# Patient Record
Sex: FEMALE | Race: WHITE | NOT HISPANIC OR LATINO
[De-identification: names, ages, dates, MRNs, and addresses within clinical notes are randomized per-mention and may not be internally consistent; named-entity substitution may affect disease eponyms.]

---
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
## 2019-05-21 ENCOUNTER — APPOINTMENT (OUTPATIENT)
Dept: ORTHOPEDIC SURGERY | Facility: CLINIC | Age: 19
End: 2019-05-21
Payer: COMMERCIAL

## 2019-05-21 VITALS
SYSTOLIC BLOOD PRESSURE: 105 MMHG | BODY MASS INDEX: 24.55 KG/M2 | HEIGHT: 68 IN | WEIGHT: 162 LBS | HEART RATE: 59 BPM | DIASTOLIC BLOOD PRESSURE: 71 MMHG

## 2019-05-21 DIAGNOSIS — S43.402A UNSPECIFIED SPRAIN OF LEFT SHOULDER JOINT, INITIAL ENCOUNTER: ICD-10-CM

## 2019-05-21 PROCEDURE — 99214 OFFICE O/P EST MOD 30 MIN: CPT

## 2019-05-21 PROCEDURE — 73030 X-RAY EXAM OF SHOULDER: CPT | Mod: LT

## 2019-12-16 ENCOUNTER — APPOINTMENT (OUTPATIENT)
Dept: ENDOCRINOLOGY | Facility: CLINIC | Age: 19
End: 2019-12-16
Payer: COMMERCIAL

## 2019-12-16 VITALS
OXYGEN SATURATION: 98 % | HEART RATE: 87 BPM | BODY MASS INDEX: 31.22 KG/M2 | HEIGHT: 68 IN | DIASTOLIC BLOOD PRESSURE: 80 MMHG | WEIGHT: 206 LBS | SYSTOLIC BLOOD PRESSURE: 108 MMHG

## 2019-12-16 DIAGNOSIS — Z82.49 FAMILY HISTORY OF ISCHEMIC HEART DISEASE AND OTHER DISEASES OF THE CIRCULATORY SYSTEM: ICD-10-CM

## 2019-12-16 DIAGNOSIS — Z80.3 FAMILY HISTORY OF MALIGNANT NEOPLASM OF BREAST: ICD-10-CM

## 2019-12-16 DIAGNOSIS — Z80.0 FAMILY HISTORY OF MALIGNANT NEOPLASM OF DIGESTIVE ORGANS: ICD-10-CM

## 2019-12-16 DIAGNOSIS — Z83.6 FAMILY HISTORY OF OTHER DISEASES OF THE RESPIRATORY SYSTEM: ICD-10-CM

## 2019-12-16 PROCEDURE — 99244 OFF/OP CNSLTJ NEW/EST MOD 40: CPT

## 2019-12-16 NOTE — CONSULT LETTER
[Consult Letter:] : I had the pleasure of evaluating your patient, [unfilled]. [Dear  ___] : Dear  [unfilled], [Please see my note below.] : Please see my note below. [Consult Closing:] : Thank you very much for allowing me to participate in the care of this patient.  If you have any questions, please do not hesitate to contact me. [Sincerely,] : Sincerely, [FreeTextEntry2] : MAKAYLA HARO MD [FreeTextEntry3] : Noel Louis MD\par

## 2019-12-16 NOTE — ASSESSMENT
[FreeTextEntry1] : This is a  19  year old female with previous history of hyperandrogenism here for evaluation for PCOS. \par \par Given the Rotterdam criteria she meets 2 /3,  she has menstrual irregularity and hyperandrogenism. She will need an updated ultrasound of the ovaries otherwise. Will be further evaluating for other causes such as non-classical CAH, hyperprolactinemia. Metabolic parameters such as diabetes and lipid profile will be screened as well. Optimal OCP with 20 mcg of estrogen to reduce insulin resistance and progesterone with least amount of androgenic activity. \par \par \par -Check testosterone, prolactin, DHEA-s, 17-OHP, HgA1C, lipid profile, salivary cortisol \par \par -Ultrasound of the ovaries with ob/gyn in future. \par \par -Continue with OCP as prescribed by ob/gyn. \par \par -Will evaluate necessity for Metformin after checking HgA1C \par \par -Will call back with results \par \par -Extensive counseling on long term life style modifications such has decreased carbohydrates in diet, maintenance of good body weight and incorporation of exercise\par \par -Follow up in 6 months

## 2019-12-16 NOTE — HISTORY OF PRESENT ILLNESS
[FreeTextEntry1] : Ms. KRISHNA SANTOS is a 19 year old female referred by MAKAYLA HARO  for evaluation of \par \par She reports that age of menarche was at age 12, with menses regular, about every 6 weeks.\par \par She left in Aug 2018, she didn't get another period until Nov 2019.  Now her periods is about every 2-3 months.  She was started on OCP about June 2019.  She is getting a cycle every month since and improvement with acne.  Some improvement with acne.  \par \par Around Oct 2018, diagnosed with PCOS due to secondary amenorrhea. \par She reports hair growth in her chin and sideburn area.  \par She has acne\par She has no change in voice\par She hed gain some weight, between Sept and Nov 2018.  She gained almost 20 lbs.  She denies any significant change.  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  She is on practice 3 hours daily and at the gym about 1.5-2 hours.    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   \par \par \par

## 2019-12-16 NOTE — DATA REVIEWED
[FreeTextEntry1] : Oct 2018\par CHOLESTEROL 159\par HDL 65\par LDL 74\par A1C 5.3\par TSH 1.08\par PROLACTIN 10 (NORMAL)\par ESTRADIOL 36 \par LH 8.4\par FSH 5.6

## 2019-12-23 LAB
17OHP SERPL-MCNC: 25 NG/DL
CHOLEST SERPL-MCNC: 180 MG/DL
CHOLEST/HDLC SERPL: 3.4 RATIO
CORTIS SAL-MCNC: NORMAL
ESTIMATED AVERAGE GLUCOSE: 114 MG/DL
FSH SERPL-MCNC: 5 IU/L
HBA1C MFR BLD HPLC: 5.6 %
HDLC SERPL-MCNC: 53 MG/DL
LDLC SERPL CALC-MCNC: 113 MG/DL
LH SERPL-ACNC: 7.4 IU/L
TRIGL SERPL-MCNC: 69 MG/DL

## 2020-01-14 NOTE — ASU PREOP CHECKLIST - HEART RATE (BEATS/MIN)
MS RN DISCHARGE NOTES

Patient discharged for Home at this time. Patient in stable condition. VS stable with no 
acute distress. Breathing even and unlabored on room air with no respiratory distress. 
Denies pain. Skin intact. Medication reconciliation and discharge orders reviewed and 
explained to Patient and daughter. Patient and daughter verbalized understanding. All 
belongings with Patient. Patient will follow up with PCP in 1 week for repeat chest xray. 
Patient will resume and complete oral antibiotics as prescribed. Escorted Patient to the 
lobby for safety. Patient picked up by daughter. 76

## 2020-06-16 ENCOUNTER — APPOINTMENT (OUTPATIENT)
Dept: ENDOCRINOLOGY | Facility: CLINIC | Age: 20
End: 2020-06-16
Payer: COMMERCIAL

## 2020-06-16 VITALS
BODY MASS INDEX: 30.16 KG/M2 | DIASTOLIC BLOOD PRESSURE: 66 MMHG | HEART RATE: 56 BPM | HEIGHT: 68 IN | OXYGEN SATURATION: 96 % | WEIGHT: 199 LBS | SYSTOLIC BLOOD PRESSURE: 118 MMHG | TEMPERATURE: 98.5 F

## 2020-06-16 PROCEDURE — 99213 OFFICE O/P EST LOW 20 MIN: CPT

## 2020-06-16 RX ORDER — NAPROXEN 500 MG/1
500 TABLET ORAL
Qty: 60 | Refills: 2 | Status: DISCONTINUED | COMMUNITY
Start: 2017-07-31 | End: 2020-06-16

## 2020-06-16 RX ORDER — NORETHINDRONE ACETATE AND ETHINYL ESTRADIOL, ETHINYL ESTRADIOL AND FERROUS FUMARATE 1MG-10(24)
1 MG-10 MCG / KIT ORAL DAILY
Refills: 0 | Status: ACTIVE | COMMUNITY
Start: 2020-06-16

## 2020-06-16 RX ORDER — MELOXICAM 15 MG/1
15 TABLET ORAL DAILY
Qty: 30 | Refills: 0 | Status: DISCONTINUED | COMMUNITY
Start: 2019-05-21 | End: 2020-06-16

## 2020-06-16 NOTE — ASSESSMENT
[FreeTextEntry1] : This is a 19 year old female with previous history of hyperandrogenism here for evaluation for PCOS. \par \par Given the Rotterdam criteria she meets 2 /3, she has menstrual irregularity and hyperandrogenism. She will need an updated ultrasound of the ovaries otherwise. Will be further evaluating for other causes such as non-classical CAH, hyperprolactinemia. Metabolic parameters such as diabetes and lipid profile will be screened as well. Optimal OCP with 20 mcg of estrogen to reduce insulin resistance and progesterone with least amount of androgenic activity. \par \par \par -Check testosterone, prolactin, DHEA-s, 17-OHP, HgA1C, lipid profile, salivary cortisol \par \par -Ultrasound of the ovaries with ob/gyn in future. \par \par -Continue with OCP as prescribed by ob/gyn. \par \par -Will evaluate necessity for Metformin after checking HgA1C \par \par -Will call back with results \par \par -Extensive counseling on long term life style modifications such has decreased carbohydrates in diet, maintenance of good body weight and incorporation of exercise\par \par -Follow up in 6 months. \par

## 2020-06-16 NOTE — END OF VISIT
[] : Resident [FreeTextEntry3] : Patient is a 19-year-old female with history of polycystic ovarian syndrome, diagnosed based on Rotterdam criteria, 3 out of 3.  Negative endocrinology work-up with normal 17 hydroxyprogesterone, salivary cortisol, prolactin level 10, normal TSH, here for endocrinology follow-up.  Reports doing much better on oral contraceptive.  Last LDL 1 A1c within target.  Will monitor in 6 months.  Discussed the risks and benefits of oral contraceptive medication.  Fertility is currently not desired.

## 2020-12-15 ENCOUNTER — APPOINTMENT (OUTPATIENT)
Dept: ENDOCRINOLOGY | Facility: CLINIC | Age: 20
End: 2020-12-15
Payer: COMMERCIAL

## 2020-12-15 VITALS
TEMPERATURE: 98.4 F | DIASTOLIC BLOOD PRESSURE: 80 MMHG | WEIGHT: 205 LBS | HEIGHT: 68 IN | BODY MASS INDEX: 31.07 KG/M2 | SYSTOLIC BLOOD PRESSURE: 120 MMHG

## 2020-12-15 DIAGNOSIS — E28.2 POLYCYSTIC OVARIAN SYNDROME: ICD-10-CM

## 2020-12-15 PROCEDURE — 99072 ADDL SUPL MATRL&STAF TM PHE: CPT

## 2020-12-15 PROCEDURE — 99213 OFFICE O/P EST LOW 20 MIN: CPT

## 2020-12-15 NOTE — DATA REVIEWED
[FreeTextEntry1] : Oct 2018\par CHOLESTEROL 159\par HDL 65\par LDL 74\par A1C 5.3\par TSH 1.08\par PROLACTIN 10 (NORMAL)\par ESTRADIOL 36 \par LH 8.4\par FSH 5.6\par \par 12/12/2020\par Cholesterol 203\par  \par A1C 5.5%

## 2020-12-15 NOTE — HISTORY OF PRESENT ILLNESS
[FreeTextEntry1] : Ms. KRISHNA SANTOS is a 20 year old female referred by MAKAYLA HARO for evaluation of PCOS.\par She reports that age of menarche was at age 12, with menses regular, about every 6 weeks.\par Prior to starting oral contraceptive, she states that her periods once every 2 to 3 months.  Very irregular.  She was started on OCP about June 2019. She is getting a cycle every month since and improvement with acne. Some improvement with acne.  Improvements with hirsutism as well.  Her weight is around 195lb to 205lbs.  \par \par Patient was diagnosed with polycystic ovarian syndrome secondary to a menorrhea.  She reported growth in her hearing chin and sideburn area.  There was no change in her voice.  She reported some weight gain, but has been fluctuating over the last few years between 10 to 15 pounds.  No excess weight gain since last visit.  She is on a softball team, she practices 3 hours daily.  She goes to the gym about 1-1/2 to 2 hours daily.  She denies any hypopigmented striate.  There is no easy bruising, there is no history of galactorrhea.  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

## 2020-12-15 NOTE — ASSESSMENT
[FreeTextEntry1] : This is a  20 year old female with previous history of hyperandrogenism here for evaluation for PCOS. \par \par Given the Rotterdam criteria she meets 2 /3,  she has menstrual irregularity and hyperandrogenism. She will need an updated ultrasound of the ovaries otherwise. Will be further evaluating for other causes such as non-classical CAH, hyperprolactinemia. Metabolic parameters such as diabetes and lipid profile will be screened as well. Optimal OCP with 20 mcg of estrogen to reduce insulin resistance and progesterone with least amount of androgenic activity. \par -US of the ovaries was done by ob/gyn in the past, and found to have polycystic ovaries.  \par -Continue with OCP as prescribed by ob/gyn. \par -Continue to hold off on Metformin.\par -Referral for a nutritionist for weight loss in office.\par -Will call back with results \par -Extensive counseling on long term life style modifications such has decreased carbohydrates in diet, maintenance of good body weight and incorporation of exercise\par \par -Follow up in 6 months

## 2020-12-29 ENCOUNTER — APPOINTMENT (OUTPATIENT)
Dept: ENDOCRINOLOGY | Facility: CLINIC | Age: 20
End: 2020-12-29
Payer: COMMERCIAL

## 2020-12-29 VITALS — WEIGHT: 210 LBS | BODY MASS INDEX: 31.83 KG/M2 | HEIGHT: 68 IN

## 2020-12-29 PROCEDURE — 97802 MEDICAL NUTRITION INDIV IN: CPT

## 2020-12-29 PROCEDURE — 99072 ADDL SUPL MATRL&STAF TM PHE: CPT

## 2021-01-20 ENCOUNTER — APPOINTMENT (OUTPATIENT)
Dept: ENDOCRINOLOGY | Facility: CLINIC | Age: 21
End: 2021-01-20
Payer: COMMERCIAL

## 2021-01-20 ENCOUNTER — TRANSCRIPTION ENCOUNTER (OUTPATIENT)
Age: 21
End: 2021-01-20

## 2021-01-20 VITALS — HEIGHT: 68 IN | WEIGHT: 212 LBS | BODY MASS INDEX: 32.13 KG/M2

## 2021-01-20 PROCEDURE — 97803 MED NUTRITION INDIV SUBSEQ: CPT

## 2021-01-20 PROCEDURE — 99072 ADDL SUPL MATRL&STAF TM PHE: CPT

## 2021-06-18 ENCOUNTER — APPOINTMENT (OUTPATIENT)
Dept: ENDOCRINOLOGY | Facility: CLINIC | Age: 21
End: 2021-06-18

## 2021-07-28 ENCOUNTER — APPOINTMENT (OUTPATIENT)
Dept: ORTHOPEDIC SURGERY | Facility: CLINIC | Age: 21
End: 2021-07-28
Payer: COMMERCIAL

## 2021-07-28 VITALS — HEIGHT: 68 IN | BODY MASS INDEX: 32.58 KG/M2 | WEIGHT: 215 LBS

## 2021-07-28 PROCEDURE — 99072 ADDL SUPL MATRL&STAF TM PHE: CPT

## 2021-07-28 PROCEDURE — 99214 OFFICE O/P EST MOD 30 MIN: CPT

## 2021-07-28 PROCEDURE — 73110 X-RAY EXAM OF WRIST: CPT | Mod: LT

## 2021-07-29 ENCOUNTER — OUTPATIENT (OUTPATIENT)
Dept: OUTPATIENT SERVICES | Facility: HOSPITAL | Age: 21
LOS: 1 days | End: 2021-07-29
Payer: COMMERCIAL

## 2021-07-29 ENCOUNTER — APPOINTMENT (OUTPATIENT)
Dept: MRI IMAGING | Facility: IMAGING CENTER | Age: 21
End: 2021-07-29
Payer: COMMERCIAL

## 2021-07-29 DIAGNOSIS — Z00.8 ENCOUNTER FOR OTHER GENERAL EXAMINATION: ICD-10-CM

## 2021-07-29 DIAGNOSIS — S69.80XA OTHER SPECIFIED INJURIES OF UNSPECIFIED WRIST, HAND AND FINGER(S), INITIAL ENCOUNTER: ICD-10-CM

## 2021-07-29 PROCEDURE — 73221 MRI JOINT UPR EXTREM W/O DYE: CPT | Mod: 26,LT

## 2021-07-29 PROCEDURE — 73221 MRI JOINT UPR EXTREM W/O DYE: CPT

## 2021-08-04 ENCOUNTER — NON-APPOINTMENT (OUTPATIENT)
Age: 21
End: 2021-08-04

## 2021-08-04 ENCOUNTER — APPOINTMENT (OUTPATIENT)
Dept: ORTHOPEDIC SURGERY | Facility: CLINIC | Age: 21
End: 2021-08-04
Payer: COMMERCIAL

## 2021-08-04 VITALS — SYSTOLIC BLOOD PRESSURE: 124 MMHG | HEART RATE: 77 BPM | DIASTOLIC BLOOD PRESSURE: 83 MMHG

## 2021-08-04 DIAGNOSIS — S69.80XA OTHER SPECIFIED INJURIES OF UNSPECIFIED WRIST, HAND AND FINGER(S), INITIAL ENCOUNTER: ICD-10-CM

## 2021-08-04 PROCEDURE — 20605 DRAIN/INJ JOINT/BURSA W/O US: CPT | Mod: LT

## 2021-08-04 PROCEDURE — 99214 OFFICE O/P EST MOD 30 MIN: CPT | Mod: 25

## 2021-08-04 RX ORDER — METHYLPRED ACET/NACL,ISO-OS/PF 40 MG/ML
40 VIAL (ML) INJECTION
Qty: 1 | Refills: 0 | Status: COMPLETED | OUTPATIENT
Start: 2021-08-04

## 2021-08-04 RX ORDER — LIDOCAINE HYDROCHLORIDE 10 MG/ML
1 INJECTION, SOLUTION INFILTRATION; PERINEURAL
Refills: 0 | Status: COMPLETED | OUTPATIENT
Start: 2021-08-04

## 2021-08-04 RX ADMIN — LIDOCAINE HYDROCHLORIDE 0.75 %: 10 INJECTION, SOLUTION INFILTRATION; PERINEURAL at 00:00

## 2021-08-04 RX ADMIN — METHYLPREDNISOLONE ACETATE 0.75 MG/ML: 40 INJECTION, SUSPENSION INTRALESIONAL; INTRAMUSCULAR; INTRASYNOVIAL; SOFT TISSUE at 00:00

## 2024-02-01 ENCOUNTER — NON-APPOINTMENT (OUTPATIENT)
Age: 24
End: 2024-02-01

## 2024-02-22 ENCOUNTER — OFFICE (OUTPATIENT)
Facility: LOCATION | Age: 24
Setting detail: OPHTHALMOLOGY
End: 2024-02-22
Payer: COMMERCIAL

## 2024-02-22 DIAGNOSIS — G93.2: ICD-10-CM

## 2024-02-22 DIAGNOSIS — H53.433: ICD-10-CM

## 2024-02-22 DIAGNOSIS — G43.009: ICD-10-CM

## 2024-02-22 DIAGNOSIS — H40.013: ICD-10-CM

## 2024-02-22 PROCEDURE — 92133 CPTRZD OPH DX IMG PST SGM ON: CPT | Performed by: OPHTHALMOLOGY

## 2024-02-22 PROCEDURE — 92083 EXTENDED VISUAL FIELD XM: CPT | Performed by: OPHTHALMOLOGY

## 2024-02-22 PROCEDURE — 92004 COMPRE OPH EXAM NEW PT 1/>: CPT | Performed by: OPHTHALMOLOGY

## 2024-02-22 ASSESSMENT — REFRACTION_AUTOREFRACTION
OD_AXIS: 039
OD_SPHERE: +0.75
OS_AXIS: 097
OS_SPHERE: +1.25
OS_CYLINDER: -0.25
OD_CYLINDER: -0.50

## 2024-02-22 ASSESSMENT — SPHEQUIV_DERIVED
OD_SPHEQUIV: 0.5
OS_SPHEQUIV: 1.125

## 2024-02-22 ASSESSMENT — CONFRONTATIONAL VISUAL FIELD TEST (CVF)
OS_FINDINGS: FULL
OD_FINDINGS: FULL

## 2024-02-26 ENCOUNTER — APPOINTMENT (OUTPATIENT)
Dept: SPINE | Facility: CLINIC | Age: 24
End: 2024-02-26